# Patient Record
Sex: MALE | Race: WHITE | NOT HISPANIC OR LATINO | URBAN - METROPOLITAN AREA
[De-identification: names, ages, dates, MRNs, and addresses within clinical notes are randomized per-mention and may not be internally consistent; named-entity substitution may affect disease eponyms.]

---

## 2018-08-15 ENCOUNTER — OFFICE (OUTPATIENT)
Dept: URBAN - METROPOLITAN AREA CLINIC 45 | Facility: CLINIC | Age: 83
End: 2018-08-15

## 2018-08-15 VITALS
WEIGHT: 160 LBS | SYSTOLIC BLOOD PRESSURE: 142 MMHG | DIASTOLIC BLOOD PRESSURE: 67 MMHG | HEIGHT: 71 IN | HEART RATE: 76 BPM

## 2018-08-15 DIAGNOSIS — C61 METASTASIS FROM MALIGNANT TUMOR OF PROSTATE: ICD-10-CM

## 2018-08-15 DIAGNOSIS — K21.9: ICD-10-CM

## 2018-08-15 DIAGNOSIS — R19.4 ALTERED BOWEL FUNCTION: ICD-10-CM

## 2018-08-15 PROCEDURE — 99203 OFFICE O/P NEW LOW 30 MIN: CPT | Performed by: INTERNAL MEDICINE

## 2018-08-15 NOTE — SERVICEHPINOTES
The patient presents for evaluation of change in bowel movements and stool consistency, intermittent heartburn and epigastric discomfort. He otherwise denies abdominal pain, melena or hematochezia, nausea or signs of infectious illness. He has a long-standing history of microscopic lymphocytic colitis, previously successfully treated by budesonide preparations. He has previously tapered and stopped his Entocort medication for Lymphocytic colitis. He no longer has dramatic urgent loose gassy stools. His weight and appetite have been stable. His current medical regimen no longer includes meds for IBS.His weight and appetite have been steady. The patient reports being treated for metastatic prostate cancer by XRT.There has been several previous colon screenings, last one around 2013.  He reportedly had a benign rectal polyp removed in 2005. The patient has no family history of colon cancer or other significant GI diseases. Patient denies shortness of breath and chest pain.

## 2018-09-21 ENCOUNTER — AMBULATORY SURGICAL CENTER (OUTPATIENT)
Dept: URBAN - METROPOLITAN AREA SURGERY 28 | Facility: SURGERY | Age: 83
End: 2018-09-21

## 2018-09-21 VITALS
WEIGHT: 160 LBS | SYSTOLIC BLOOD PRESSURE: 164 MMHG | OXYGEN SATURATION: 98 % | TEMPERATURE: 97 F | HEIGHT: 71 IN | DIASTOLIC BLOOD PRESSURE: 85 MMHG | RESPIRATION RATE: 16 BRPM | HEART RATE: 57 BPM

## 2018-09-21 DIAGNOSIS — K31.89 OTHER DISEASES OF STOMACH AND DUODENUM: ICD-10-CM

## 2018-09-21 DIAGNOSIS — K57.30 DVRTCLOS OF LG INT W/O PERFORATION OR ABSCESS W/O BLEEDING: ICD-10-CM

## 2018-09-21 DIAGNOSIS — Z86.010 PERSONAL HISTORY OF COLONIC POLYPS: ICD-10-CM

## 2018-09-21 DIAGNOSIS — K64.8 OTHER HEMORRHOIDS: ICD-10-CM

## 2018-09-21 DIAGNOSIS — K29.70 GASTRITIS, UNSPECIFIED, WITHOUT BLEEDING: ICD-10-CM

## 2018-09-21 DIAGNOSIS — R19.4 CHANGE IN BOWEL HABIT: ICD-10-CM

## 2018-09-21 LAB — SURGICAL: PDF REPORT: (no result)

## 2018-09-21 PROCEDURE — 45378 DIAGNOSTIC COLONOSCOPY: CPT | Performed by: INTERNAL MEDICINE

## 2018-09-21 PROCEDURE — 43239 EGD BIOPSY SINGLE/MULTIPLE: CPT | Performed by: INTERNAL MEDICINE

## 2019-12-05 ENCOUNTER — HOSPITAL ENCOUNTER (INPATIENT)
Dept: HOSPITAL 12 - REHAB | Age: 84
LOS: 7 days | Discharge: HOME HEALTH | DRG: 559 | End: 2019-12-12
Attending: PHYSICAL MEDICINE & REHABILITATION | Admitting: PHYSICAL MEDICINE & REHABILITATION
Payer: MEDICARE

## 2019-12-05 VITALS — SYSTOLIC BLOOD PRESSURE: 136 MMHG | DIASTOLIC BLOOD PRESSURE: 66 MMHG

## 2019-12-05 VITALS — HEIGHT: 69 IN | BODY MASS INDEX: 23.47 KG/M2 | WEIGHT: 158.5 LBS

## 2019-12-05 DIAGNOSIS — R26.2: ICD-10-CM

## 2019-12-05 DIAGNOSIS — Z47.89: Primary | ICD-10-CM

## 2019-12-05 DIAGNOSIS — G92: ICD-10-CM

## 2019-12-05 DIAGNOSIS — I10: ICD-10-CM

## 2019-12-05 DIAGNOSIS — G31.84: ICD-10-CM

## 2019-12-05 DIAGNOSIS — M48.061: ICD-10-CM

## 2019-12-05 DIAGNOSIS — M51.16: ICD-10-CM

## 2019-12-05 DIAGNOSIS — R53.1: ICD-10-CM

## 2019-12-05 DIAGNOSIS — G25.0: ICD-10-CM

## 2019-12-05 DIAGNOSIS — I27.20: ICD-10-CM

## 2019-12-05 DIAGNOSIS — R32: ICD-10-CM

## 2019-12-05 DIAGNOSIS — Z90.79: ICD-10-CM

## 2019-12-05 DIAGNOSIS — Z85.46: ICD-10-CM

## 2019-12-06 VITALS — SYSTOLIC BLOOD PRESSURE: 146 MMHG | DIASTOLIC BLOOD PRESSURE: 70 MMHG

## 2019-12-06 VITALS — SYSTOLIC BLOOD PRESSURE: 134 MMHG | DIASTOLIC BLOOD PRESSURE: 67 MMHG

## 2019-12-06 VITALS — SYSTOLIC BLOOD PRESSURE: 147 MMHG | DIASTOLIC BLOOD PRESSURE: 73 MMHG

## 2019-12-06 VITALS — SYSTOLIC BLOOD PRESSURE: 142 MMHG | DIASTOLIC BLOOD PRESSURE: 77 MMHG

## 2019-12-06 RX ADMIN — Medication SCH MG: at 13:00

## 2019-12-06 RX ADMIN — METHOCARBAMOL TABLETS SCH MG: 750 TABLET, COATED ORAL at 14:27

## 2019-12-06 RX ADMIN — SENNOSIDES SCH TAB: 8.6 TABLET, COATED ORAL at 20:28

## 2019-12-06 RX ADMIN — DOCUSATE SODIUM SCH MG: 100 CAPSULE, LIQUID FILLED ORAL at 09:54

## 2019-12-06 RX ADMIN — QUETIAPINE SCH MG: 25 TABLET, FILM COATED ORAL at 20:28

## 2019-12-06 RX ADMIN — Medication SCH MG: at 12:11

## 2019-12-06 RX ADMIN — DOCUSATE SODIUM SCH MG: 100 CAPSULE, LIQUID FILLED ORAL at 18:32

## 2019-12-06 RX ADMIN — METHOCARBAMOL TABLETS SCH MG: 500 TABLET, COATED ORAL at 21:03

## 2019-12-06 RX ADMIN — Medication SCH MG: at 18:32

## 2019-12-06 RX ADMIN — METHOCARBAMOL TABLETS SCH MG: 750 TABLET, COATED ORAL at 11:23

## 2019-12-07 VITALS — DIASTOLIC BLOOD PRESSURE: 58 MMHG | SYSTOLIC BLOOD PRESSURE: 120 MMHG

## 2019-12-07 VITALS — SYSTOLIC BLOOD PRESSURE: 103 MMHG | DIASTOLIC BLOOD PRESSURE: 69 MMHG

## 2019-12-07 VITALS — DIASTOLIC BLOOD PRESSURE: 80 MMHG | SYSTOLIC BLOOD PRESSURE: 137 MMHG

## 2019-12-07 VITALS — DIASTOLIC BLOOD PRESSURE: 69 MMHG | SYSTOLIC BLOOD PRESSURE: 118 MMHG

## 2019-12-07 RX ADMIN — Medication SCH MG: at 11:50

## 2019-12-07 RX ADMIN — SENNOSIDES SCH TAB: 8.6 TABLET, COATED ORAL at 20:45

## 2019-12-07 RX ADMIN — Medication SCH MG: at 11:00

## 2019-12-07 RX ADMIN — QUETIAPINE SCH MG: 25 TABLET, FILM COATED ORAL at 20:45

## 2019-12-07 RX ADMIN — METHOCARBAMOL TABLETS SCH MG: 500 TABLET, COATED ORAL at 14:47

## 2019-12-07 RX ADMIN — IBUPROFEN PRN MG: 600 TABLET, FILM COATED ORAL at 09:52

## 2019-12-07 RX ADMIN — Medication SCH MG: at 18:39

## 2019-12-07 RX ADMIN — DOCUSATE SODIUM SCH MG: 100 CAPSULE, LIQUID FILLED ORAL at 09:20

## 2019-12-07 RX ADMIN — LIDOCAINE SCH PATCH: 50 PATCH CUTANEOUS at 09:20

## 2019-12-07 RX ADMIN — Medication SCH MG: at 06:05

## 2019-12-07 RX ADMIN — DOCUSATE SODIUM SCH MG: 100 CAPSULE, LIQUID FILLED ORAL at 16:05

## 2019-12-07 RX ADMIN — METHOCARBAMOL TABLETS SCH MG: 500 TABLET, COATED ORAL at 21:00

## 2019-12-07 RX ADMIN — HYDROCORTISONE PRN APPLIC: 5 CREAM TOPICAL at 06:16

## 2019-12-07 RX ADMIN — METHOCARBAMOL TABLETS SCH MG: 500 TABLET, COATED ORAL at 06:04

## 2019-12-07 RX ADMIN — IBUPROFEN PRN MG: 600 TABLET, FILM COATED ORAL at 20:48

## 2019-12-07 RX ADMIN — Medication SCH MG: at 14:47

## 2019-12-08 VITALS — SYSTOLIC BLOOD PRESSURE: 132 MMHG | DIASTOLIC BLOOD PRESSURE: 74 MMHG

## 2019-12-08 VITALS — SYSTOLIC BLOOD PRESSURE: 147 MMHG | DIASTOLIC BLOOD PRESSURE: 60 MMHG

## 2019-12-08 VITALS — SYSTOLIC BLOOD PRESSURE: 127 MMHG | DIASTOLIC BLOOD PRESSURE: 71 MMHG

## 2019-12-08 RX ADMIN — DOCUSATE SODIUM SCH MG: 100 CAPSULE, LIQUID FILLED ORAL at 09:31

## 2019-12-08 RX ADMIN — METHOCARBAMOL TABLETS SCH MG: 500 TABLET, COATED ORAL at 06:19

## 2019-12-08 RX ADMIN — LIDOCAINE SCH PATCH: 50 PATCH CUTANEOUS at 09:00

## 2019-12-08 RX ADMIN — DOCUSATE SODIUM SCH MG: 100 CAPSULE, LIQUID FILLED ORAL at 18:16

## 2019-12-08 RX ADMIN — Medication SCH MG: at 18:16

## 2019-12-08 RX ADMIN — QUETIAPINE SCH MG: 25 TABLET, FILM COATED ORAL at 20:16

## 2019-12-08 RX ADMIN — Medication SCH MG: at 09:30

## 2019-12-08 RX ADMIN — METHOCARBAMOL TABLETS SCH MG: 500 TABLET, COATED ORAL at 21:13

## 2019-12-08 RX ADMIN — SENNOSIDES SCH TAB: 8.6 TABLET, COATED ORAL at 20:16

## 2019-12-08 RX ADMIN — METHOCARBAMOL TABLETS SCH MG: 500 TABLET, COATED ORAL at 15:16

## 2019-12-08 RX ADMIN — Medication SCH MG: at 06:19

## 2019-12-08 RX ADMIN — IBUPROFEN PRN MG: 600 TABLET, FILM COATED ORAL at 09:29

## 2019-12-08 RX ADMIN — HYDROCORTISONE PRN APPLIC: 5 CREAM TOPICAL at 16:28

## 2019-12-08 RX ADMIN — Medication SCH MG: at 15:17

## 2019-12-08 RX ADMIN — CLONAZEPAM SCH MG: 1 TABLET ORAL at 20:16

## 2019-12-09 VITALS — DIASTOLIC BLOOD PRESSURE: 66 MMHG | SYSTOLIC BLOOD PRESSURE: 121 MMHG

## 2019-12-09 VITALS — SYSTOLIC BLOOD PRESSURE: 155 MMHG | DIASTOLIC BLOOD PRESSURE: 67 MMHG

## 2019-12-09 RX ADMIN — METHOCARBAMOL TABLETS SCH MG: 500 TABLET, COATED ORAL at 12:57

## 2019-12-09 RX ADMIN — METHOCARBAMOL TABLETS SCH MG: 500 TABLET, COATED ORAL at 21:08

## 2019-12-09 RX ADMIN — Medication SCH MG: at 15:00

## 2019-12-09 RX ADMIN — CLONAZEPAM SCH MG: 1 TABLET ORAL at 21:07

## 2019-12-09 RX ADMIN — SENNOSIDES SCH TAB: 8.6 TABLET, COATED ORAL at 21:07

## 2019-12-09 RX ADMIN — METHOCARBAMOL TABLETS SCH MG: 500 TABLET, COATED ORAL at 06:07

## 2019-12-09 RX ADMIN — Medication SCH MG: at 18:12

## 2019-12-09 RX ADMIN — LIDOCAINE SCH PATCH: 50 PATCH CUTANEOUS at 09:17

## 2019-12-09 RX ADMIN — DOCUSATE SODIUM SCH MG: 100 CAPSULE, LIQUID FILLED ORAL at 17:00

## 2019-12-09 RX ADMIN — Medication SCH MG: at 06:37

## 2019-12-09 RX ADMIN — DOCUSATE SODIUM SCH MG: 100 CAPSULE, LIQUID FILLED ORAL at 09:17

## 2019-12-09 RX ADMIN — Medication SCH MG: at 12:56

## 2019-12-09 RX ADMIN — QUETIAPINE SCH MG: 25 TABLET, FILM COATED ORAL at 21:08

## 2019-12-10 VITALS — DIASTOLIC BLOOD PRESSURE: 58 MMHG | SYSTOLIC BLOOD PRESSURE: 151 MMHG

## 2019-12-10 VITALS — DIASTOLIC BLOOD PRESSURE: 52 MMHG | SYSTOLIC BLOOD PRESSURE: 147 MMHG

## 2019-12-10 VITALS — SYSTOLIC BLOOD PRESSURE: 136 MMHG | DIASTOLIC BLOOD PRESSURE: 49 MMHG

## 2019-12-10 VITALS — SYSTOLIC BLOOD PRESSURE: 126 MMHG | DIASTOLIC BLOOD PRESSURE: 61 MMHG

## 2019-12-10 LAB
BASOPHILS # BLD AUTO: 0.1 K/UL (ref 0–8)
BASOPHILS NFR BLD AUTO: 1.3 % (ref 0–2)
BUN SERPL-MCNC: 15 MG/DL (ref 7–18)
CHLORIDE SERPL-SCNC: 105 MMOL/L (ref 98–107)
CO2 SERPL-SCNC: 28 MMOL/L (ref 21–32)
CREAT SERPL-MCNC: 1.2 MG/DL (ref 0.6–1.3)
EOSINOPHIL # BLD AUTO: 0.1 K/UL (ref 0–0.7)
EOSINOPHIL NFR BLD AUTO: 3.2 % (ref 0–7)
GLUCOSE SERPL-MCNC: 94 MG/DL (ref 74–106)
HCT VFR BLD AUTO: 45.2 % (ref 36.7–47.1)
HGB BLD-MCNC: 14.7 G/DL (ref 12.5–16.3)
LYMPHOCYTES # BLD AUTO: 1 K/UL (ref 20–40)
LYMPHOCYTES NFR BLD AUTO: 21.5 % (ref 20.5–51.5)
MCH RBC QN AUTO: 29.3 UUG (ref 23.8–33.4)
MCHC RBC AUTO-ENTMCNC: 33 G/DL (ref 32.5–36.3)
MCV RBC AUTO: 90.2 FL (ref 73–96.2)
MONOCYTES # BLD AUTO: 0.7 K/UL (ref 2–10)
MONOCYTES NFR BLD AUTO: 14.6 % (ref 0–11)
NEUTROPHILS # BLD AUTO: 2.7 K/UL (ref 1.8–8.9)
NEUTROPHILS NFR BLD AUTO: 59.4 % (ref 38.5–71.5)
PLATELET # BLD AUTO: 258 K/UL (ref 152–348)
POTASSIUM SERPL-SCNC: 4.2 MMOL/L (ref 3.5–5.1)
RBC # BLD AUTO: 5.01 MIL/UL (ref 4.06–5.63)
WBC # BLD AUTO: 4.5 K/UL (ref 3.6–10.2)

## 2019-12-10 RX ADMIN — SENNOSIDES SCH TAB: 8.6 TABLET, COATED ORAL at 20:17

## 2019-12-10 RX ADMIN — LIDOCAINE SCH PATCH: 50 PATCH CUTANEOUS at 08:37

## 2019-12-10 RX ADMIN — CLONAZEPAM SCH MG: 1 TABLET ORAL at 20:17

## 2019-12-10 RX ADMIN — Medication SCH MG: at 06:04

## 2019-12-10 RX ADMIN — Medication SCH MG: at 11:04

## 2019-12-10 RX ADMIN — METHOCARBAMOL TABLETS SCH MG: 500 TABLET, COATED ORAL at 14:38

## 2019-12-10 RX ADMIN — Medication SCH MG: at 18:24

## 2019-12-10 RX ADMIN — QUETIAPINE SCH MG: 25 TABLET, FILM COATED ORAL at 20:19

## 2019-12-10 RX ADMIN — METHOCARBAMOL TABLETS SCH MG: 500 TABLET, COATED ORAL at 06:03

## 2019-12-10 RX ADMIN — DOCUSATE SODIUM SCH MG: 100 CAPSULE, LIQUID FILLED ORAL at 08:35

## 2019-12-10 RX ADMIN — Medication SCH MG: at 15:00

## 2019-12-10 RX ADMIN — LIDOCAINE SCH PATCH: 50 PATCH CUTANEOUS at 08:35

## 2019-12-10 RX ADMIN — METHOCARBAMOL TABLETS SCH MG: 500 TABLET, COATED ORAL at 21:29

## 2019-12-10 RX ADMIN — DOCUSATE SODIUM SCH MG: 100 CAPSULE, LIQUID FILLED ORAL at 16:21

## 2019-12-11 VITALS — SYSTOLIC BLOOD PRESSURE: 131 MMHG | DIASTOLIC BLOOD PRESSURE: 62 MMHG

## 2019-12-11 VITALS — DIASTOLIC BLOOD PRESSURE: 45 MMHG | SYSTOLIC BLOOD PRESSURE: 114 MMHG

## 2019-12-11 VITALS — SYSTOLIC BLOOD PRESSURE: 106 MMHG | DIASTOLIC BLOOD PRESSURE: 61 MMHG

## 2019-12-11 RX ADMIN — Medication PRN MG: at 20:29

## 2019-12-11 RX ADMIN — Medication SCH MG: at 06:15

## 2019-12-11 RX ADMIN — QUETIAPINE SCH MG: 25 TABLET, FILM COATED ORAL at 20:29

## 2019-12-11 RX ADMIN — Medication SCH MG: at 11:44

## 2019-12-11 RX ADMIN — METHOCARBAMOL TABLETS SCH MG: 500 TABLET, COATED ORAL at 14:15

## 2019-12-11 RX ADMIN — DOCUSATE SODIUM SCH MG: 100 CAPSULE, LIQUID FILLED ORAL at 08:47

## 2019-12-11 RX ADMIN — SENNOSIDES SCH TAB: 8.6 TABLET, COATED ORAL at 20:29

## 2019-12-11 RX ADMIN — METHOCARBAMOL TABLETS SCH MG: 500 TABLET, COATED ORAL at 22:28

## 2019-12-11 RX ADMIN — CLONAZEPAM SCH MG: 1 TABLET ORAL at 20:29

## 2019-12-11 RX ADMIN — Medication PRN MG: at 08:47

## 2019-12-11 RX ADMIN — Medication SCH MG: at 15:09

## 2019-12-11 RX ADMIN — METHOCARBAMOL TABLETS SCH MG: 500 TABLET, COATED ORAL at 06:14

## 2019-12-11 RX ADMIN — Medication SCH MG: at 18:48

## 2019-12-11 RX ADMIN — DOCUSATE SODIUM SCH MG: 100 CAPSULE, LIQUID FILLED ORAL at 17:43

## 2019-12-12 VITALS — SYSTOLIC BLOOD PRESSURE: 142 MMHG | DIASTOLIC BLOOD PRESSURE: 77 MMHG

## 2019-12-12 VITALS — DIASTOLIC BLOOD PRESSURE: 56 MMHG | SYSTOLIC BLOOD PRESSURE: 126 MMHG

## 2019-12-12 VITALS — SYSTOLIC BLOOD PRESSURE: 121 MMHG | DIASTOLIC BLOOD PRESSURE: 65 MMHG

## 2019-12-12 RX ADMIN — Medication SCH MG: at 06:13

## 2019-12-12 RX ADMIN — Medication SCH MG: at 11:12

## 2019-12-12 RX ADMIN — DOCUSATE SODIUM SCH MG: 100 CAPSULE, LIQUID FILLED ORAL at 08:46

## 2019-12-12 RX ADMIN — Medication PRN MG: at 08:46

## 2019-12-12 RX ADMIN — METHOCARBAMOL TABLETS SCH MG: 500 TABLET, COATED ORAL at 05:37

## 2020-04-09 ENCOUNTER — OFFICE (OUTPATIENT)
Dept: URBAN - METROPOLITAN AREA CLINIC 45 | Facility: CLINIC | Age: 85
End: 2020-04-09

## 2020-04-09 VITALS — HEIGHT: 71 IN

## 2020-04-09 DIAGNOSIS — K56.609: ICD-10-CM

## 2020-04-09 DIAGNOSIS — C61: ICD-10-CM

## 2020-04-09 DIAGNOSIS — R19.4 ALTERED BOWEL FUNCTION: ICD-10-CM

## 2020-04-09 DIAGNOSIS — K21.9: ICD-10-CM

## 2020-04-09 PROCEDURE — G0406 INPT/TELE FOLLOW UP 15: HCPCS | Performed by: INTERNAL MEDICINE

## 2020-04-09 PROCEDURE — 99214 OFFICE O/P EST MOD 30 MIN: CPT | Performed by: INTERNAL MEDICINE

## 2020-04-09 NOTE — SERVICEHPINOTES
The patient reports recent acute onset of abdominal pain and constipation that happened about a week ago.  [facility], presumably for small bowel obstruction.  Symptoms improved with conservative management, including nasogastric suction and IV fluids hydration. He noticed recent change in bowel movements and stool consistency, intermittent diarrhea and abdominal cramping. He otherwise denies abdominal pain, nausea, melena or hematochezia. Patient had a long-standing history of microscopic lymphocytic colitis, previously treated by budesonide. He has tapered and stopped his Entocort medication for lymphocytic colitis a while ago. The patient has been previously treated for metastatic prostate cancer by XRT. There has been several previous colon screenings, last one done by me in September 2018, only showing noninflamed diverticulosis. Random biopsy showed slight increase in intraepithelial lymphocytes. He reportedly had a benign rectal polyp removed in 2005.

## 2020-04-14 ENCOUNTER — OFFICE (OUTPATIENT)
Dept: URBAN - METROPOLITAN AREA CLINIC 45 | Facility: CLINIC | Age: 85
End: 2020-04-14

## 2020-04-14 VITALS — HEIGHT: 71 IN

## 2020-04-14 DIAGNOSIS — K56.609: ICD-10-CM

## 2020-04-14 DIAGNOSIS — R10.33 ABDOMINAL PAIN PERIUMBILICAL: ICD-10-CM

## 2020-04-14 PROCEDURE — 99212 OFFICE O/P EST SF 10 MIN: CPT | Performed by: INTERNAL MEDICINE

## 2020-04-14 RX ORDER — DICYCLOMINE HYDROCHLORIDE 20 MG/1
80 TABLET ORAL
Qty: 60 | Status: COMPLETED
Start: 2020-04-14 | End: 2023-09-11

## 2020-04-14 NOTE — SERVICEHPINOTES
The patient complains of several episodes of voluminous diarrhea, followed by periumbilical cramping and pain, symptoms started 3 days ago. Diarrhea has overall improved, yet pain persists. He denies nocturnal awakening with pain, melena, hematochezia or having foul-smelling stools.  He also denies fever or chills, nausea. Patient was discharged from [facility] about a week ago following an episode of partial small bowel obstruction, which was resolved by conservative management. He had unremarkable small bowel series with barium done prior to discharge. Patient has a history of diarrhea-predominant IBS and microscopic lymphocytic colitis, previously treated by budesonide.

## 2020-04-28 ENCOUNTER — OFFICE (OUTPATIENT)
Dept: URBAN - METROPOLITAN AREA CLINIC 45 | Facility: CLINIC | Age: 85
End: 2020-04-28

## 2020-04-28 VITALS — HEIGHT: 71 IN

## 2020-04-28 DIAGNOSIS — C61: ICD-10-CM

## 2020-04-28 DIAGNOSIS — R10.33: ICD-10-CM

## 2020-04-28 DIAGNOSIS — K56.609: ICD-10-CM

## 2020-04-28 DIAGNOSIS — R19.4: ICD-10-CM

## 2020-04-28 PROCEDURE — G0406 INPT/TELE FOLLOW UP 15: HCPCS | Performed by: INTERNAL MEDICINE

## 2020-04-28 PROCEDURE — 99212 OFFICE O/P EST SF 10 MIN: CPT | Performed by: INTERNAL MEDICINE

## 2020-04-28 RX ORDER — POLYETHYLENE GLYCOL 3350 17 G/17G
17 POWDER, FOR SOLUTION ORAL
Qty: 1 | Status: COMPLETED
Start: 2020-04-28 | End: 2023-09-11

## 2020-04-28 NOTE — SERVICEHPINOTES
The patient returns for telephone follow-up with complains of recent worsening of constipation, intermittent abdominal cramping and pain. He reports moving his bowels every other day, but describes significant abdominal straining with hard stools. He has used an enema to facilitate defecation. Patient continues to deny nocturnal awakening with pain, melena, hematochezia, fever or nausea. Patient was discharged from [facility] several weeks ago following partial small bowel obstruction, which was resolved by conservative management. He had unremarkable small bowel series with barium done prior to discharge. Patient has a history of diarrhea-predominant IBS and microscopic lymphocytic colitis, previously treated by budesonide.

## 2020-06-11 ENCOUNTER — OFFICE (OUTPATIENT)
Dept: URBAN - METROPOLITAN AREA CLINIC 45 | Facility: CLINIC | Age: 85
End: 2020-06-11

## 2020-06-11 VITALS — HEIGHT: 71 IN | WEIGHT: 162 LBS

## 2020-06-11 DIAGNOSIS — K21.9: ICD-10-CM

## 2020-06-11 DIAGNOSIS — C61: ICD-10-CM

## 2020-06-11 DIAGNOSIS — R19.4: ICD-10-CM

## 2020-06-11 DIAGNOSIS — R10.33: ICD-10-CM

## 2020-06-11 PROCEDURE — G0406 INPT/TELE FOLLOW UP 15: HCPCS | Performed by: INTERNAL MEDICINE

## 2020-06-11 PROCEDURE — 99213 OFFICE O/P EST LOW 20 MIN: CPT | Performed by: INTERNAL MEDICINE

## 2020-06-11 NOTE — SERVICEHPINOTES
The patient returns for telephone follow-up with complains of constipation, intermittent abdominal cramping and pain. Symptoms are associated with low back pain controlled by frequent use of tramadol. Patient continued to experience abdominal straining upon defecation and hard stools despite trial of stool softeners and MiraLAX. He has periodically been using enemas to facilitate defecation. Patient otherwise denies nocturnal awakening with pain, melena, hematochezia, fever or nausea. He has recently had unremarkable small bowel series with barium following an episode of partial small bowel obstruction treated conservatively. Patient has a history of diarrhea-predominant IBS and microscopic lymphocytic colitis, previously treated by budesonide.

## 2023-09-11 ENCOUNTER — OFFICE (OUTPATIENT)
Dept: URBAN - METROPOLITAN AREA CLINIC 45 | Facility: CLINIC | Age: 88
End: 2023-09-11

## 2023-09-11 VITALS
SYSTOLIC BLOOD PRESSURE: 120 MMHG | HEIGHT: 71 IN | HEART RATE: 67 BPM | DIASTOLIC BLOOD PRESSURE: 91 MMHG | WEIGHT: 146 LBS

## 2023-09-11 DIAGNOSIS — R19.4: ICD-10-CM

## 2023-09-11 DIAGNOSIS — K21.9: ICD-10-CM

## 2023-09-11 DIAGNOSIS — K57.30 DVRTCLOS OF LG INT W/O PERFORATION OR ABSCESS W/O BLEEDING: ICD-10-CM

## 2023-09-11 DIAGNOSIS — K56.609: ICD-10-CM

## 2023-09-11 DIAGNOSIS — G91.2: ICD-10-CM

## 2023-09-11 DIAGNOSIS — K59.03 THERAPEUTIC OPIOID-INDUCED CONSTIPATION (OIC): ICD-10-CM

## 2023-09-11 PROCEDURE — 99204 OFFICE O/P NEW MOD 45 MIN: CPT | Performed by: INTERNAL MEDICINE

## 2023-09-11 RX ORDER — LUBIPROSTONE 24 UG/1
48 CAPSULE, GELATIN COATED ORAL
Qty: 60 | Status: ACTIVE
Start: 2023-09-11

## 2023-11-30 ENCOUNTER — OFFICE (OUTPATIENT)
Dept: URBAN - METROPOLITAN AREA CLINIC 45 | Facility: CLINIC | Age: 88
End: 2023-11-30

## 2023-11-30 VITALS
DIASTOLIC BLOOD PRESSURE: 42 MMHG | WEIGHT: 137 LBS | HEART RATE: 79 BPM | SYSTOLIC BLOOD PRESSURE: 130 MMHG | HEIGHT: 71 IN

## 2023-11-30 DIAGNOSIS — K56.41 FECAL IMPACTION: ICD-10-CM

## 2023-11-30 DIAGNOSIS — R63.4 WEIGHT LOSS: ICD-10-CM

## 2023-11-30 DIAGNOSIS — R10.33: ICD-10-CM

## 2023-11-30 DIAGNOSIS — G91.2: ICD-10-CM

## 2023-11-30 DIAGNOSIS — G89.29 CHRONIC PAIN: ICD-10-CM

## 2023-11-30 DIAGNOSIS — K59.03 THERAPEUTIC OPIOID-INDUCED CONSTIPATION (OIC): ICD-10-CM

## 2023-11-30 DIAGNOSIS — R19.4: ICD-10-CM

## 2023-11-30 PROBLEM — K57.30 DVRTCLOS OF LG INT W/O PERFORATION OR ABSCESS W/O BLEEDING: Status: ACTIVE | Noted: 2018-09-21

## 2023-11-30 PROCEDURE — 99214 OFFICE O/P EST MOD 30 MIN: CPT | Performed by: INTERNAL MEDICINE

## 2023-11-30 RX ORDER — LUBIPROSTONE 24 UG/1
48 CAPSULE, GELATIN COATED ORAL
Qty: 60 | Status: ACTIVE
Start: 2023-09-11

## 2023-12-21 ENCOUNTER — OFFICE (OUTPATIENT)
Dept: URBAN - METROPOLITAN AREA CLINIC 45 | Facility: CLINIC | Age: 88
End: 2023-12-21

## 2023-12-21 VITALS
SYSTOLIC BLOOD PRESSURE: 124 MMHG | HEIGHT: 71 IN | DIASTOLIC BLOOD PRESSURE: 41 MMHG | HEART RATE: 63 BPM | WEIGHT: 140 LBS

## 2023-12-21 DIAGNOSIS — N18.9 CHRONIC KIDNEY DISEASE(NOT FAILURE): ICD-10-CM

## 2023-12-21 DIAGNOSIS — K56.41 FECAL IMPACTION IN RECTUM: ICD-10-CM

## 2023-12-21 DIAGNOSIS — K59.03 THERAPEUTIC OPIOID-INDUCED CONSTIPATION (OIC): ICD-10-CM

## 2023-12-21 DIAGNOSIS — C61: ICD-10-CM

## 2023-12-21 DIAGNOSIS — G91.2: ICD-10-CM

## 2023-12-21 PROCEDURE — 99214 OFFICE O/P EST MOD 30 MIN: CPT | Performed by: INTERNAL MEDICINE

## 2023-12-21 RX ORDER — BISACODYL 10 MG/1
SUPPOSITORY RECTAL
Qty: 15 | Status: ACTIVE
Start: 2023-12-21

## 2023-12-21 RX ORDER — METHYLNALTREXONE BROMIDE 150 MG/1
150 TABLET ORAL
Qty: 30 | Status: ACTIVE
Start: 2023-12-21

## 2025-04-30 ENCOUNTER — OFFICE (OUTPATIENT)
Dept: URBAN - METROPOLITAN AREA CLINIC 45 | Facility: CLINIC | Age: 89
End: 2025-04-30

## 2025-04-30 VITALS
WEIGHT: 132 LBS | SYSTOLIC BLOOD PRESSURE: 128 MMHG | HEIGHT: 71 IN | HEART RATE: 75 BPM | DIASTOLIC BLOOD PRESSURE: 40 MMHG

## 2025-04-30 DIAGNOSIS — K21.9: ICD-10-CM

## 2025-04-30 DIAGNOSIS — R32 URINARY INCONTINENCE: ICD-10-CM

## 2025-04-30 PROCEDURE — G2211 COMPLEX E/M VISIT ADD ON: HCPCS | Performed by: INTERNAL MEDICINE

## 2025-04-30 PROCEDURE — 99213 OFFICE O/P EST LOW 20 MIN: CPT | Performed by: INTERNAL MEDICINE

## 2025-04-30 NOTE — SERVICEHPINOTES
The patient returns for office follow-up of chronic constipation, alternating with diarrhea and intermittent abdominal pain. He actually reports resolution of bowel problems following gradual discontinuation of opiates. He still gets occasional abdominal discomfort which is tolerable. Patient's current main complaint is urinary frequency and associated discomfort from having to wear diapers causing skin irritation. Constipation previously caused by opiates used to control low back pain/metastatic prostate cancer. Constipation did not previously respond to stool softeners, MiraLAX, Amitiza and Linzess and Movantik and Relistor. Patient has a history of diarrhea-predominant IBS and microscopic lymphocytic colitis, previously treated by budesonide, which later converted to constipation. There has been a previous colon screening with EGD performed by me in 2018. No gross abnormalities were found except diverticulosis and hemorrhoids.